# Patient Record
Sex: FEMALE | Race: WHITE | NOT HISPANIC OR LATINO | Employment: OTHER | URBAN - METROPOLITAN AREA
[De-identification: names, ages, dates, MRNs, and addresses within clinical notes are randomized per-mention and may not be internally consistent; named-entity substitution may affect disease eponyms.]

---

## 2024-09-16 ENCOUNTER — APPOINTMENT (OUTPATIENT)
Dept: PRIMARY CARE | Facility: CLINIC | Age: 64
End: 2024-09-16
Payer: COMMERCIAL

## 2024-09-16 VITALS
SYSTOLIC BLOOD PRESSURE: 122 MMHG | WEIGHT: 151 LBS | DIASTOLIC BLOOD PRESSURE: 68 MMHG | OXYGEN SATURATION: 98 % | BODY MASS INDEX: 26.75 KG/M2 | HEART RATE: 86 BPM | HEIGHT: 63 IN

## 2024-09-16 DIAGNOSIS — Z12.31 BREAST CANCER SCREENING BY MAMMOGRAM: ICD-10-CM

## 2024-09-16 DIAGNOSIS — Z13.220 LIPID SCREENING: ICD-10-CM

## 2024-09-16 DIAGNOSIS — Z12.11 ENCOUNTER FOR SCREENING FOR MALIGNANT NEOPLASM OF COLON: ICD-10-CM

## 2024-09-16 DIAGNOSIS — R53.83 OTHER FATIGUE: ICD-10-CM

## 2024-09-16 DIAGNOSIS — Z00.00 ANNUAL PHYSICAL EXAM: ICD-10-CM

## 2024-09-16 DIAGNOSIS — Z00.00 MEDICARE ANNUAL WELLNESS VISIT, INITIAL: Primary | ICD-10-CM

## 2024-09-16 DIAGNOSIS — Z13.29 THYROID DISORDER SCREENING: ICD-10-CM

## 2024-09-16 PROCEDURE — 3008F BODY MASS INDEX DOCD: CPT | Performed by: STUDENT IN AN ORGANIZED HEALTH CARE EDUCATION/TRAINING PROGRAM

## 2024-09-16 PROCEDURE — G0438 PPPS, INITIAL VISIT: HCPCS | Performed by: STUDENT IN AN ORGANIZED HEALTH CARE EDUCATION/TRAINING PROGRAM

## 2024-09-16 PROCEDURE — 1159F MED LIST DOCD IN RCRD: CPT | Performed by: STUDENT IN AN ORGANIZED HEALTH CARE EDUCATION/TRAINING PROGRAM

## 2024-09-16 PROCEDURE — 1036F TOBACCO NON-USER: CPT | Performed by: STUDENT IN AN ORGANIZED HEALTH CARE EDUCATION/TRAINING PROGRAM

## 2024-09-16 PROCEDURE — 1160F RVW MEDS BY RX/DR IN RCRD: CPT | Performed by: STUDENT IN AN ORGANIZED HEALTH CARE EDUCATION/TRAINING PROGRAM

## 2024-09-16 PROCEDURE — 1170F FXNL STATUS ASSESSED: CPT | Performed by: STUDENT IN AN ORGANIZED HEALTH CARE EDUCATION/TRAINING PROGRAM

## 2024-09-16 PROCEDURE — 99387 INIT PM E/M NEW PAT 65+ YRS: CPT | Performed by: STUDENT IN AN ORGANIZED HEALTH CARE EDUCATION/TRAINING PROGRAM

## 2024-09-16 PROCEDURE — 1124F ACP DISCUSS-NO DSCNMKR DOCD: CPT | Performed by: STUDENT IN AN ORGANIZED HEALTH CARE EDUCATION/TRAINING PROGRAM

## 2024-09-16 ASSESSMENT — ENCOUNTER SYMPTOMS
GASTROINTESTINAL NEGATIVE: 1
NERVOUS/ANXIOUS: 0
FATIGUE: 0
PALPITATIONS: 0
CHEST TIGHTNESS: 0
SHORTNESS OF BREATH: 0
LIGHT-HEADEDNESS: 0
DYSPHORIC MOOD: 0
HEADACHES: 0
MUSCULOSKELETAL NEGATIVE: 1
DIFFICULTY URINATING: 0
ABDOMINAL PAIN: 0
UNEXPECTED WEIGHT CHANGE: 0
DIARRHEA: 0
WHEEZING: 0
ACTIVITY CHANGE: 0
SINUS PRESSURE: 0
SLEEP DISTURBANCE: 0
CONSTIPATION: 0
DIZZINESS: 0

## 2024-09-16 ASSESSMENT — PATIENT HEALTH QUESTIONNAIRE - PHQ9
SUM OF ALL RESPONSES TO PHQ9 QUESTIONS 1 AND 2: 0
2. FEELING DOWN, DEPRESSED OR HOPELESS: NOT AT ALL
2. FEELING DOWN, DEPRESSED OR HOPELESS: NOT AT ALL
SUM OF ALL RESPONSES TO PHQ9 QUESTIONS 1 AND 2: 0
1. LITTLE INTEREST OR PLEASURE IN DOING THINGS: NOT AT ALL
1. LITTLE INTEREST OR PLEASURE IN DOING THINGS: NOT AT ALL

## 2024-09-16 ASSESSMENT — ACTIVITIES OF DAILY LIVING (ADL)
DRESSING: INDEPENDENT
DOING_HOUSEWORK: INDEPENDENT
GROCERY_SHOPPING: INDEPENDENT
BATHING: INDEPENDENT
TAKING_MEDICATION: INDEPENDENT
MANAGING_FINANCES: INDEPENDENT

## 2024-09-16 NOTE — PROGRESS NOTES
Subjective   Patient ID: Sonya Macario is a 64 y.o. female who presents for Medicare Annual Wellness Visit Initial (Medicare initial wellness ).  Reports doesn't always walk as fast as her family due to joint pains.     Previously was going to [facility]. Last labs were 2017.     Last mammogram was many years ago.     Reports prior sigmoidoscopy many years ago, states was not sedated. Denies any bowel changes.     No recent blood tests.     Reports no issues with sleep.     No other concerns today.         Review of Systems   Constitutional:  Negative for activity change, fatigue and unexpected weight change.   HENT: Negative.  Negative for congestion, ear pain and sinus pressure.    Eyes:  Negative for visual disturbance.   Respiratory:  Negative for chest tightness, shortness of breath and wheezing.    Cardiovascular:  Negative for chest pain, palpitations and leg swelling.   Gastrointestinal: Negative.  Negative for abdominal pain, constipation and diarrhea.   Genitourinary:  Negative for difficulty urinating.   Musculoskeletal: Negative.    Skin:  Negative for rash.   Neurological:  Negative for dizziness, light-headedness and headaches.   Psychiatric/Behavioral:  Negative for dysphoric mood and sleep disturbance. The patient is not nervous/anxious.    All other systems reviewed and are negative.      Objective   Physical Exam  Vitals reviewed.   Constitutional:       General: She is not in acute distress.  HENT:      Head: Normocephalic.      Right Ear: External ear normal.      Left Ear: External ear normal.      Nose: Nose normal.   Eyes:      Extraocular Movements: Extraocular movements intact.      Pupils: Pupils are equal, round, and reactive to light.   Cardiovascular:      Rate and Rhythm: Normal rate and regular rhythm.      Heart sounds: Normal heart sounds.   Pulmonary:      Effort: Pulmonary effort is normal.      Breath sounds: Normal breath sounds.   Abdominal:      Palpations: Abdomen is  soft.      Tenderness: There is no abdominal tenderness. There is no guarding.   Musculoskeletal:      Cervical back: Normal range of motion and neck supple.   Skin:     General: Skin is warm and dry.   Neurological:      Mental Status: She is alert. Mental status is at baseline.   Psychiatric:         Mood and Affect: Mood normal.         Behavior: Behavior normal.         Body mass index is 26.75 kg/m².    No current outpatient medications on file.\      Assessment/Plan   Diagnoses and all orders for this visit:  Medicare annual wellness visit, initial  Comments:  encouraged her to get updated mammogram and complete cologuard  orders placed  Encounter for screening for malignant neoplasm of colon  -     Cologuard® colon cancer screening; Future  Lipid screening  -     Lipid panel; Future  Thyroid disorder screening  -     Tsh With Reflex To Free T4 If Abnormal; Future  Breast cancer screening by mammogram  -     BI mammo bilateral screening tomosynthesis; Future  Other fatigue  -     Hemoglobin A1c; Future  -     Comprehensive metabolic panel; Future  -     CBC and Auto Differential; Future  Annual physical exam    Follow up annually       Kiah Yung DO 09/16/24 12:40 PM

## 2024-09-19 ENCOUNTER — LAB (OUTPATIENT)
Dept: LAB | Facility: LAB | Age: 64
End: 2024-09-19
Payer: COMMERCIAL

## 2024-09-19 DIAGNOSIS — R53.83 OTHER FATIGUE: ICD-10-CM

## 2024-09-19 DIAGNOSIS — Z13.29 THYROID DISORDER SCREENING: ICD-10-CM

## 2024-09-19 DIAGNOSIS — Z13.220 LIPID SCREENING: ICD-10-CM

## 2024-09-19 LAB
ALBUMIN SERPL BCP-MCNC: 4.4 G/DL (ref 3.4–5)
ALP SERPL-CCNC: 78 U/L (ref 33–136)
ALT SERPL W P-5'-P-CCNC: 34 U/L (ref 7–45)
ANION GAP SERPL CALC-SCNC: 10 MMOL/L (ref 10–20)
AST SERPL W P-5'-P-CCNC: 26 U/L (ref 9–39)
BASOPHILS # BLD AUTO: 0.07 X10*3/UL (ref 0–0.1)
BASOPHILS NFR BLD AUTO: 1.1 %
BILIRUB SERPL-MCNC: 0.9 MG/DL (ref 0–1.2)
BUN SERPL-MCNC: 16 MG/DL (ref 6–23)
CALCIUM SERPL-MCNC: 9.5 MG/DL (ref 8.6–10.3)
CHLORIDE SERPL-SCNC: 103 MMOL/L (ref 98–107)
CHOLEST SERPL-MCNC: 260 MG/DL (ref 0–199)
CHOLESTEROL/HDL RATIO: 4.3
CO2 SERPL-SCNC: 31 MMOL/L (ref 21–32)
CREAT SERPL-MCNC: 0.87 MG/DL (ref 0.5–1.05)
EGFRCR SERPLBLD CKD-EPI 2021: 74 ML/MIN/1.73M*2
EOSINOPHIL # BLD AUTO: 0.21 X10*3/UL (ref 0–0.7)
EOSINOPHIL NFR BLD AUTO: 3.3 %
ERYTHROCYTE [DISTWIDTH] IN BLOOD BY AUTOMATED COUNT: 12.8 % (ref 11.5–14.5)
GLUCOSE SERPL-MCNC: 98 MG/DL (ref 74–99)
HCT VFR BLD AUTO: 41.1 % (ref 36–46)
HDLC SERPL-MCNC: 60.8 MG/DL
HGB BLD-MCNC: 13.7 G/DL (ref 12–16)
IMM GRANULOCYTES # BLD AUTO: 0.02 X10*3/UL (ref 0–0.7)
IMM GRANULOCYTES NFR BLD AUTO: 0.3 % (ref 0–0.9)
LDLC SERPL CALC-MCNC: 174 MG/DL
LYMPHOCYTES # BLD AUTO: 2.73 X10*3/UL (ref 1.2–4.8)
LYMPHOCYTES NFR BLD AUTO: 43 %
MCH RBC QN AUTO: 30.3 PG (ref 26–34)
MCHC RBC AUTO-ENTMCNC: 33.3 G/DL (ref 32–36)
MCV RBC AUTO: 91 FL (ref 80–100)
MONOCYTES # BLD AUTO: 0.48 X10*3/UL (ref 0.1–1)
MONOCYTES NFR BLD AUTO: 7.6 %
NEUTROPHILS # BLD AUTO: 2.84 X10*3/UL (ref 1.2–7.7)
NEUTROPHILS NFR BLD AUTO: 44.7 %
NON HDL CHOLESTEROL: 199 MG/DL (ref 0–149)
NRBC BLD-RTO: 0 /100 WBCS (ref 0–0)
PLATELET # BLD AUTO: 219 X10*3/UL (ref 150–450)
POTASSIUM SERPL-SCNC: 3.9 MMOL/L (ref 3.5–5.3)
PROT SERPL-MCNC: 7.6 G/DL (ref 6.4–8.2)
RBC # BLD AUTO: 4.52 X10*6/UL (ref 4–5.2)
SODIUM SERPL-SCNC: 140 MMOL/L (ref 136–145)
TRIGL SERPL-MCNC: 125 MG/DL (ref 0–149)
TSH SERPL-ACNC: 1.23 MIU/L (ref 0.44–3.98)
VLDL: 25 MG/DL (ref 0–40)
WBC # BLD AUTO: 6.4 X10*3/UL (ref 4.4–11.3)

## 2024-09-19 PROCEDURE — 36415 COLL VENOUS BLD VENIPUNCTURE: CPT

## 2024-09-19 PROCEDURE — 80053 COMPREHEN METABOLIC PANEL: CPT

## 2024-09-19 PROCEDURE — 83036 HEMOGLOBIN GLYCOSYLATED A1C: CPT

## 2024-09-19 PROCEDURE — 85025 COMPLETE CBC W/AUTO DIFF WBC: CPT

## 2024-09-19 PROCEDURE — 80061 LIPID PANEL: CPT

## 2024-09-19 PROCEDURE — 84443 ASSAY THYROID STIM HORMONE: CPT

## 2024-09-20 LAB
EST. AVERAGE GLUCOSE BLD GHB EST-MCNC: 137 MG/DL
HBA1C MFR BLD: 6.4 %

## 2024-10-04 ENCOUNTER — APPOINTMENT (OUTPATIENT)
Dept: RADIOLOGY | Facility: CLINIC | Age: 64
End: 2024-10-04
Payer: COMMERCIAL

## 2024-10-10 ENCOUNTER — HOSPITAL ENCOUNTER (OUTPATIENT)
Dept: RADIOLOGY | Facility: CLINIC | Age: 64
Discharge: HOME | End: 2024-10-10
Payer: MEDICARE

## 2024-10-10 DIAGNOSIS — R92.8 ABNORMAL MAMMOGRAM: Primary | ICD-10-CM

## 2024-10-10 DIAGNOSIS — Z12.31 BREAST CANCER SCREENING BY MAMMOGRAM: ICD-10-CM

## 2024-10-10 PROCEDURE — 77063 BREAST TOMOSYNTHESIS BI: CPT | Performed by: RADIOLOGY

## 2024-10-10 PROCEDURE — 77067 SCR MAMMO BI INCL CAD: CPT | Performed by: RADIOLOGY

## 2024-10-10 PROCEDURE — 77067 SCR MAMMO BI INCL CAD: CPT

## 2024-10-21 ENCOUNTER — HOSPITAL ENCOUNTER (OUTPATIENT)
Dept: RADIOLOGY | Facility: CLINIC | Age: 64
Discharge: HOME | End: 2024-10-21
Payer: MEDICARE

## 2024-10-21 DIAGNOSIS — R92.8 ABNORMAL MAMMOGRAM: ICD-10-CM

## 2024-10-21 PROCEDURE — G0279 TOMOSYNTHESIS, MAMMO: HCPCS | Performed by: STUDENT IN AN ORGANIZED HEALTH CARE EDUCATION/TRAINING PROGRAM

## 2024-10-21 PROCEDURE — 77066 DX MAMMO INCL CAD BI: CPT | Performed by: STUDENT IN AN ORGANIZED HEALTH CARE EDUCATION/TRAINING PROGRAM

## 2024-10-21 PROCEDURE — 77062 BREAST TOMOSYNTHESIS BI: CPT

## 2024-12-08 LAB — NONINV COLON CA DNA+OCC BLD SCRN STL QL: NEGATIVE

## 2025-03-28 ENCOUNTER — APPOINTMENT (OUTPATIENT)
Dept: PRIMARY CARE | Facility: CLINIC | Age: 65
End: 2025-03-28
Payer: COMMERCIAL

## 2025-03-28 VITALS
OXYGEN SATURATION: 96 % | SYSTOLIC BLOOD PRESSURE: 112 MMHG | DIASTOLIC BLOOD PRESSURE: 60 MMHG | WEIGHT: 146 LBS | HEART RATE: 73 BPM | BODY MASS INDEX: 25.87 KG/M2 | HEIGHT: 63 IN

## 2025-03-28 DIAGNOSIS — R53.83 OTHER FATIGUE: ICD-10-CM

## 2025-03-28 DIAGNOSIS — G89.29 CHRONIC PAIN OF BOTH WRISTS: ICD-10-CM

## 2025-03-28 DIAGNOSIS — M25.531 CHRONIC PAIN OF BOTH WRISTS: ICD-10-CM

## 2025-03-28 DIAGNOSIS — E13.9 OTHER SPECIFIED DIABETES MELLITUS WITHOUT COMPLICATION, WITHOUT LONG-TERM CURRENT USE OF INSULIN: ICD-10-CM

## 2025-03-28 DIAGNOSIS — Z13.220 LIPID SCREENING: Primary | ICD-10-CM

## 2025-03-28 DIAGNOSIS — M25.532 CHRONIC PAIN OF BOTH WRISTS: ICD-10-CM

## 2025-03-28 DIAGNOSIS — R73.03 PREDIABETES: ICD-10-CM

## 2025-03-28 LAB — HBA1C MFR BLD: 6.1 % (ref 4.2–6.5)

## 2025-03-28 PROCEDURE — 3008F BODY MASS INDEX DOCD: CPT | Performed by: STUDENT IN AN ORGANIZED HEALTH CARE EDUCATION/TRAINING PROGRAM

## 2025-03-28 PROCEDURE — 1159F MED LIST DOCD IN RCRD: CPT | Performed by: STUDENT IN AN ORGANIZED HEALTH CARE EDUCATION/TRAINING PROGRAM

## 2025-03-28 PROCEDURE — 99214 OFFICE O/P EST MOD 30 MIN: CPT | Performed by: STUDENT IN AN ORGANIZED HEALTH CARE EDUCATION/TRAINING PROGRAM

## 2025-03-28 PROCEDURE — 83036 HEMOGLOBIN GLYCOSYLATED A1C: CPT | Mod: CLIA WAIVED TEST | Performed by: STUDENT IN AN ORGANIZED HEALTH CARE EDUCATION/TRAINING PROGRAM

## 2025-03-28 PROCEDURE — 1036F TOBACCO NON-USER: CPT | Performed by: STUDENT IN AN ORGANIZED HEALTH CARE EDUCATION/TRAINING PROGRAM

## 2025-03-28 PROCEDURE — 1160F RVW MEDS BY RX/DR IN RCRD: CPT | Performed by: STUDENT IN AN ORGANIZED HEALTH CARE EDUCATION/TRAINING PROGRAM

## 2025-03-28 ASSESSMENT — PATIENT HEALTH QUESTIONNAIRE - PHQ9
1. LITTLE INTEREST OR PLEASURE IN DOING THINGS: NOT AT ALL
SUM OF ALL RESPONSES TO PHQ9 QUESTIONS 1 AND 2: 0
2. FEELING DOWN, DEPRESSED OR HOPELESS: NOT AT ALL

## 2025-03-28 NOTE — PROGRESS NOTES
Subjective   Patient ID: Sonya Macario is a 65 y.o. female who presents for Follow-up (6 month follow up /A1C check /Bilateral wrist pain and joint pain. Broke both wrists as a child so she said they will hurt from time to time ).  History of Present Illness  Sonya Macario is a 65 year old female with diabetes who presents for follow-up on her blood sugar control and cholesterol levels.    She has been actively modifying her diet and exercise regimen to improve her blood sugar levels. She has reduced sugar intake, limiting herself to one can of pop per week and opting for unsweetened iced tea with a splash of lemonade. Candy consumption has decreased significantly, and she now only has donuts once a month. She has lost five pounds, although her goal was twenty. Her A1c has improved from 6.4 to 6.1. She has increased her physical activity, attending the rec center five days a week, which includes a half-hour on the bike daily and a yoga balance class once a week. She started using the rec center more frequently since October due to the weather.    She is concerned about her cholesterol levels, particularly due to her 's family history of high cholesterol, although she is unaware of any such history in her own family. She is mindful of her diet, choosing multi-grain bread and lower sodium turkey breast, and has been watching her sodium intake since her twenties due to swelling when flying.    She experienced wrist pain, which she attributes to old injuries from childhood. The pain resolved with pressure and Tylenol. Her wrist sometimes clicks, and she has had carpal tunnel symptoms in the past, which improved with rest.    She has noticed improvements in her digestion since modifying her diet, particularly reducing chili intake, which previously caused heartburn. She drinks pop infrequently and occasionally has a glass of wine with Sprite.     She experiences occasional sharp back pain, which resolves quickly  "and attributes these transient pains to aging.    She had a mammogram that showed microcalcifications, which she wonders might be related to her puppy jumping on her chest.     Review of Systems  ROS otherwise negative aside from what was mentioned above in HPI.    Objective     /60 (BP Location: Left arm, Patient Position: Sitting, BP Cuff Size: Adult)   Pulse 73   Ht 1.6 m (5' 3\")   Wt 66.2 kg (146 lb)   SpO2 96%   BMI 25.86 kg/m²      No current outpatient medications on file.    Physical Exam  GENERAL: Alert, cooperative, well developed, no acute distress.  HEENT: Normocephalic, normal oropharynx, moist mucous membranes.  CHEST: Clear to auscultation bilaterally, no wheezes, rhonchi, or crackles.  CARDIOVASCULAR: Normal heart rate and rhythm, S1 and S2 normal without murmurs.  ABDOMEN: Soft, non-tender, non-distended, without organomegaly, normal bowel sounds.  EXTREMITIES: No cyanosis or edema.  NEUROLOGICAL: Cranial nerves grossly intact, moves all extremities without gross motor or sensory deficit.    Results  LABS  A1c: 6.1 (03/28/2025)    RADIOLOGY  Mammogram: Microcalcifications in the breast, not concerning    Assessment & Plan  Pre-diabetes  A1c improved from 6.4% to 6.1%, indicating better glycemic control. She has been reducing sugar intake and increasing physical activity, contributing to this improvement. Concerned about family history of diabetes, but current management is effective. Consistency in lifestyle modifications is key.  - Continue current lifestyle modifications, including regular exercise and reduced sugar intake.  - Monitor A1c levels regularly to track progress.    Hyperlipidemia  Elevated cholesterol levels. No family history of hyperlipidemia, but 's family has high cholesterol. She is mindful of dietary choices, opting for lower sodium and healthier options. Cholesterol levels will be checked today to assess current status.  - Check cholesterol levels today to " assess current status.  - Continue managing diet to control cholesterol levels.    Joint Pain  Experienced wrist pain, resolved with pressure and acetaminophen. Pain possibly related to weather changes and old injuries from childhood. History of carpal tunnel syndrome but no recent issues. Pain may be influenced by barometric pressure changes.  - Consider further evaluation if pain persists or worsens.    General Health Maintenance  Engaged in regular exercise and dietary modifications. Mindful of sodium intake due to past experiences with swelling during flights. Plans to use compression socks during upcoming flight to prevent swelling.  - Encourage continued regular exercise and healthy eating habits.  - Use compression socks during flights to prevent swelling.  - Schedule annual wellness visit in the fall for routine check-up and repeat labs.        Kiah Yung, DO     This medical note was created with the assistance of artificial intelligence (AI) for documentation purposes. The content has been reviewed and confirmed by the healthcare provider for accuracy and completeness. Patient consented to the use of audio recording and use of AI during their visit.

## 2025-03-29 LAB
CHOLEST SERPL-MCNC: 272 MG/DL
CHOLEST/HDLC SERPL: 4.3 (CALC)
HDLC SERPL-MCNC: 63 MG/DL
LDLC SERPL CALC-MCNC: 183 MG/DL (CALC)
NONHDLC SERPL-MCNC: 209 MG/DL (CALC)
TRIGL SERPL-MCNC: 125 MG/DL

## 2025-04-24 ENCOUNTER — TELEPHONE (OUTPATIENT)
Dept: PRIMARY CARE | Facility: CLINIC | Age: 65
End: 2025-04-24
Payer: MEDICARE

## 2025-04-24 DIAGNOSIS — R92.8 ABNORMAL MAMMOGRAM: Primary | ICD-10-CM

## 2025-04-24 NOTE — TELEPHONE ENCOUNTER
Sonya Renaldo called wanting to know if she could get another referral for a diagnostic mammogram. She said that something showed up on her mammogram and they told her she needed another one but insurance was giving her a hard time.  She last seen Dr Yung on 3/8/25 and mammogram was done 10/21/24. Please advise and thank you.

## 2025-05-09 ENCOUNTER — HOSPITAL ENCOUNTER (OUTPATIENT)
Dept: RADIOLOGY | Facility: CLINIC | Age: 65
Discharge: HOME | End: 2025-05-09
Payer: MEDICARE

## 2025-05-09 ENCOUNTER — DOCUMENTATION (OUTPATIENT)
Dept: SURGERY | Facility: CLINIC | Age: 65
End: 2025-05-09
Payer: MEDICARE

## 2025-05-09 DIAGNOSIS — R92.8 ABNORMAL MAMMOGRAM: ICD-10-CM

## 2025-05-09 PROCEDURE — 77065 DX MAMMO INCL CAD UNI: CPT | Mod: LT

## 2025-05-09 PROCEDURE — 77065 DX MAMMO INCL CAD UNI: CPT | Mod: LEFT SIDE | Performed by: RADIOLOGY

## 2025-05-09 PROCEDURE — 77061 BREAST TOMOSYNTHESIS UNI: CPT | Mod: LEFT SIDE | Performed by: RADIOLOGY

## 2025-05-09 NOTE — PROGRESS NOTES
Patient here for breast imaging. She has been recommended a left breast stereotactic biopsy. I met with the patient and explained the procedure to her. She would like to think about if she would like to have the biopsy or not. I explained that its important for her to let us know whether or not she will have it done so we can make sure she is scheduled for follow up as needed. I will send a message to Dr. Yung with an update as well.

## 2025-05-13 ENCOUNTER — TELEPHONE (OUTPATIENT)
Dept: SURGERY | Facility: CLINIC | Age: 65
End: 2025-05-13
Payer: MEDICARE

## 2025-05-13 NOTE — TELEPHONE ENCOUNTER
Left voicemail to check in with patient and see if she had any questions and have made any decisions about her need for a breast biopsy.

## 2025-05-22 NOTE — PROGRESS NOTES
"History Of Present Illness  HPI   The patient is a 65-year-old female who had a recent abnormal left breast mammogram.  She has no palpable breast lumps and no breast pain.  No nipple discharge or retraction.  No prior breast biopsy.  Age of menarche was 10.  G2, P2.  Age at first childbirth was 22.  Menopause in 2007.  Family history: No breast cancer.  Maternal aunt may have had metastatic uterine cancer.    Past medical history:  Prediabetes  Broken nose at age 15    Past Medical History  She has no past medical history on file.    Surgical History  She has no past surgical history on file.     Allergies  Sulfamethoxazole-trimethoprim    Social History  She reports that she has never smoked. She has never used smokeless tobacco. She reports current alcohol use. She reports that she does not use drugs.    Family History  Family History[1]    Review of Systems  Review of Systems:  Constitutional:  no fever, no chills, no significant weight change  Neurological: No history of CVA or seizure disorder  Eyes: No pain, no recent visual change  ENT:  No recent hearing loss  Neck: No pain  Cardiovascular: No chest pain, no history of cardiac disease such as myocardial infarction or arrhythmia or congestive heart failure  Pulmonary: No shortness of breath, no history of pulmonary disease such as pneumonia or COPD  Breast: No history of breast disease or breast mass  Gastrointestinal:   no abdominal pain, no nausea or vomiting, no constipation or diarrhea or blood in the stool.  No history of ulcers.  No liver, gallbladder or pancreas disease.  No intestinal disorder.  Genitourinary: No hematuria or dysuria, no kidney disease  Musculoskeletal: Occasional left knee pain  Integumentary:  no rash  Psychiatric:  No anxiety or depression  Endocrine: As above  Hematologic/Lymphatic: Bruises easily    Last Recorded Vitals  Blood pressure 132/78, pulse 75, temperature 35.7 °C (96.3 °F), resp. rate 18, height 1.6 m (5' 3\"), weight " 64.4 kg (142 lb), SpO2 99%.    Physical Exam  Constitutional: Well-developed, well-nourished, alert and oriented, no acute distress  Skin: Warm and dry, no lesions, no rashes, no jaundice  HEENT: Normocephalic, atraumatic, EOMI, no scleral icterus, mucous membranes moist, no lesions seen  Neck: Soft, nontender, no mass or adenopathy  Cardiac: Regular rate and rhythm, no murmur  Chest: Patent airway, clear to auscultation, normal breath sounds with good chest expansion, no wheezes or rales or rhonchi noted, thorax symmetric  Breast:     right breast: No skin changes, nontender, no mass, mild fibrocystic change    left breast: No skin changes, nontender, no mass, mild fibrocystic change  Abdomen: Nondistended, soft, nontender, no mass  Rectal: Not performed  Extremities: No injury, no lower extremity edema or calf tenderness  Lymphatic: No cervical or axillary adenopathy  Musculoskeletal: Range of motion intact, no joint swelling, normal strength  Neurological: Alert and oriented x3, intact sensory and motor function, no obvious focal neurologic abnormalities  Psychological: Appropriate mood and behavior  Examination chaperoned by Gaviota    Relevant Results  I reviewed the left breast diagnostic mammogram from May 9, 2025:  IMPRESSION:  Grouping of left breast microcalcifications as described for which  stereotactic biopsy recommended.  BI-RADS CATEGORY:  BI-RADS Category:  4 Suspicious.  Recommendation:  Surgical Consultation and Biopsy.  Recommended Date:  Immediate.  Laterality:  Left.    I reviewed the bilateral diagnostic mammogram report and images from October 21, 2024:  IMPRESSION:  1. Probably benign left breast calcifications. A six-month  mammographic follow-up is recommended to ensure stability.  2. Resolution of the right breast asymmetry.  BI-RADS Category:  3 Probably Benign.  Recommendation:  Short-term Interval Follow-up Imaging.  Recommended Date:  6 Months.  Laterality:  Left.      Assessment/Plan    Diagnoses and all orders for this visit:  Abnormal mammogram of left breast  No breast mass on examination.  Left breast subareolar calcifications which the radiologist feels is slightly more pronounced than previous mammogram and recommends stereotactic biopsy.    Follow-up after biopsy completed.  I answered all of the patient's questions.    Shiraz Espinosa MD         [1] No family history on file.

## 2025-05-23 ENCOUNTER — OFFICE VISIT (OUTPATIENT)
Dept: SURGERY | Facility: CLINIC | Age: 65
End: 2025-05-23
Payer: MEDICARE

## 2025-05-23 VITALS
RESPIRATION RATE: 18 BRPM | HEART RATE: 75 BPM | SYSTOLIC BLOOD PRESSURE: 132 MMHG | WEIGHT: 142 LBS | DIASTOLIC BLOOD PRESSURE: 78 MMHG | BODY MASS INDEX: 25.16 KG/M2 | HEIGHT: 63 IN | TEMPERATURE: 96.3 F | OXYGEN SATURATION: 99 %

## 2025-05-23 DIAGNOSIS — R92.8 ABNORMAL MAMMOGRAM OF LEFT BREAST: Primary | ICD-10-CM

## 2025-05-23 PROCEDURE — 99204 OFFICE O/P NEW MOD 45 MIN: CPT | Performed by: SURGERY

## 2025-05-23 PROCEDURE — 1159F MED LIST DOCD IN RCRD: CPT | Performed by: SURGERY

## 2025-05-23 PROCEDURE — 1126F AMNT PAIN NOTED NONE PRSNT: CPT | Performed by: SURGERY

## 2025-05-23 PROCEDURE — 3008F BODY MASS INDEX DOCD: CPT | Performed by: SURGERY

## 2025-05-23 PROCEDURE — 99214 OFFICE O/P EST MOD 30 MIN: CPT | Performed by: SURGERY

## 2025-05-23 PROCEDURE — 1036F TOBACCO NON-USER: CPT | Performed by: SURGERY

## 2025-05-23 SDOH — ECONOMIC STABILITY: FOOD INSECURITY: WITHIN THE PAST 12 MONTHS, YOU WORRIED THAT YOUR FOOD WOULD RUN OUT BEFORE YOU GOT MONEY TO BUY MORE.: NEVER TRUE

## 2025-05-23 SDOH — ECONOMIC STABILITY: FOOD INSECURITY: WITHIN THE PAST 12 MONTHS, THE FOOD YOU BOUGHT JUST DIDN'T LAST AND YOU DIDN'T HAVE MONEY TO GET MORE.: NEVER TRUE

## 2025-05-23 ASSESSMENT — PATIENT HEALTH QUESTIONNAIRE - PHQ9
SUM OF ALL RESPONSES TO PHQ9 QUESTIONS 1 & 2: 0
1. LITTLE INTEREST OR PLEASURE IN DOING THINGS: NOT AT ALL
2. FEELING DOWN, DEPRESSED OR HOPELESS: NOT AT ALL

## 2025-05-23 ASSESSMENT — COLUMBIA-SUICIDE SEVERITY RATING SCALE - C-SSRS
6. HAVE YOU EVER DONE ANYTHING, STARTED TO DO ANYTHING, OR PREPARED TO DO ANYTHING TO END YOUR LIFE?: NO
2. HAVE YOU ACTUALLY HAD ANY THOUGHTS OF KILLING YOURSELF?: NO
1. IN THE PAST MONTH, HAVE YOU WISHED YOU WERE DEAD OR WISHED YOU COULD GO TO SLEEP AND NOT WAKE UP?: NO

## 2025-05-23 ASSESSMENT — ENCOUNTER SYMPTOMS
LOSS OF SENSATION IN FEET: 1
DEPRESSION: 0
OCCASIONAL FEELINGS OF UNSTEADINESS: 0

## 2025-05-23 ASSESSMENT — LIFESTYLE VARIABLES
SKIP TO QUESTIONS 9-10: 1
HOW MANY STANDARD DRINKS CONTAINING ALCOHOL DO YOU HAVE ON A TYPICAL DAY: 1 OR 2
HOW OFTEN DO YOU HAVE SIX OR MORE DRINKS ON ONE OCCASION: NEVER
AUDIT-C TOTAL SCORE: 1
HOW OFTEN DO YOU HAVE A DRINK CONTAINING ALCOHOL: MONTHLY OR LESS

## 2025-05-23 ASSESSMENT — PAIN SCALES - GENERAL: PAINLEVEL_OUTOF10: 0-NO PAIN

## 2025-05-23 NOTE — LETTER
May 23, 2025     Kiah Yung DO  [address] [facility] [address]    Patient: Sonya Macario   YOB: 1960   Date of Visit: 5/23/2025       Dear Dr. Kiah Yung DO:    Thank you for referring Sonya Macario to me for evaluation. Below are my notes for this consultation.  If you have questions, please do not hesitate to call me. I look forward to following your patient along with you.       Sincerely,     Shiraz Espinosa MD      CC: No Recipients  ______________________________________________________________________________________    History Of Present Illness  HPI   The patient is a 65-year-old female who had a recent abnormal left breast mammogram.  She has no palpable breast lumps and no breast pain.  No nipple discharge or retraction.  No prior breast biopsy.  Age of menarche was 10.  G2, P2.  Age at first childbirth was 22.  Menopause in 2007.  Family history: No breast cancer.  Maternal aunt may have had metastatic uterine cancer.    Past medical history:  Prediabetes  Broken nose at age 15    Past Medical History  She has no past medical history on file.    Surgical History  She has no past surgical history on file.     Allergies  Sulfamethoxazole-trimethoprim    Social History  She reports that she has never smoked. She has never used smokeless tobacco. She reports current alcohol use. She reports that she does not use drugs.    Family History  Family History[1]    Review of Systems  Review of Systems:  Constitutional:  no fever, no chills, no significant weight change  Neurological: No history of CVA or seizure disorder  Eyes: No pain, no recent visual change  ENT:  No recent hearing loss  Neck: No pain  Cardiovascular: No chest pain, no history of cardiac disease such as myocardial infarction or arrhythmia or congestive heart failure  Pulmonary: No shortness of breath, no history of pulmonary disease such as pneumonia or COPD  Breast: No history of breast disease or breast  "mass  Gastrointestinal:   no abdominal pain, no nausea or vomiting, no constipation or diarrhea or blood in the stool.  No history of ulcers.  No liver, gallbladder or pancreas disease.  No intestinal disorder.  Genitourinary: No hematuria or dysuria, no kidney disease  Musculoskeletal: Occasional left knee pain  Integumentary:  no rash  Psychiatric:  No anxiety or depression  Endocrine: As above  Hematologic/Lymphatic: Bruises easily    Last Recorded Vitals  Blood pressure 132/78, pulse 75, temperature 35.7 °C (96.3 °F), resp. rate 18, height 1.6 m (5' 3\"), weight 64.4 kg (142 lb), SpO2 99%.    Physical Exam  Constitutional: Well-developed, well-nourished, alert and oriented, no acute distress  Skin: Warm and dry, no lesions, no rashes, no jaundice  HEENT: Normocephalic, atraumatic, EOMI, no scleral icterus, mucous membranes moist, no lesions seen  Neck: Soft, nontender, no mass or adenopathy  Cardiac: Regular rate and rhythm, no murmur  Chest: Patent airway, clear to auscultation, normal breath sounds with good chest expansion, no wheezes or rales or rhonchi noted, thorax symmetric  Breast:     right breast: No skin changes, nontender, no mass, mild fibrocystic change    left breast: No skin changes, nontender, no mass, mild fibrocystic change  Abdomen: Nondistended, soft, nontender, no mass  Rectal: Not performed  Extremities: No injury, no lower extremity edema or calf tenderness  Lymphatic: No cervical or axillary adenopathy  Musculoskeletal: Range of motion intact, no joint swelling, normal strength  Neurological: Alert and oriented x3, intact sensory and motor function, no obvious focal neurologic abnormalities  Psychological: Appropriate mood and behavior  Examination chaperoned by Gaviota    Relevant Results  I reviewed the left breast diagnostic mammogram from May 9, 2025:  IMPRESSION:  Grouping of left breast microcalcifications as described for which  stereotactic biopsy recommended.  BI-RADS " CATEGORY:  BI-RADS Category:  4 Suspicious.  Recommendation:  Surgical Consultation and Biopsy.  Recommended Date:  Immediate.  Laterality:  Left.    I reviewed the bilateral diagnostic mammogram report and images from October 21, 2024:  IMPRESSION:  1. Probably benign left breast calcifications. A six-month  mammographic follow-up is recommended to ensure stability.  2. Resolution of the right breast asymmetry.  BI-RADS Category:  3 Probably Benign.  Recommendation:  Short-term Interval Follow-up Imaging.  Recommended Date:  6 Months.  Laterality:  Left.      Assessment/Plan   Diagnoses and all orders for this visit:  Abnormal mammogram of left breast  No breast mass on examination.  Left breast subareolar calcifications which the radiologist feels is slightly more pronounced than previous mammogram and recommends stereotactic biopsy.    Follow-up after biopsy completed.  I answered all of the patient's questions.    Shiraz Espinosa MD         [1]  No family history on file.       [1]  No family history on file.

## 2025-05-27 ENCOUNTER — TELEPHONE (OUTPATIENT)
Dept: SURGERY | Facility: CLINIC | Age: 65
End: 2025-05-27
Payer: MEDICARE

## 2025-05-28 ENCOUNTER — TELEPHONE (OUTPATIENT)
Dept: SURGERY | Facility: CLINIC | Age: 65
End: 2025-05-28
Payer: MEDICARE

## 2025-05-28 NOTE — TELEPHONE ENCOUNTER
Spoke with patient, she states that she doesn't want to have the clip placed. I explained that we need to have the clip inserted in order to do the biopsy. She is worried about migration, numbness post op, and any other complications. Once I told her the clip needed to be placed she stated she would like to think about it again and call me tomorrow. I will continue to follow up with her.

## 2025-05-29 DIAGNOSIS — R92.8 ABNORMAL FINDING ON BREAST IMAGING: ICD-10-CM

## 2025-06-05 ENCOUNTER — HOSPITAL ENCOUNTER (OUTPATIENT)
Dept: RADIOLOGY | Facility: CLINIC | Age: 65
Discharge: HOME | End: 2025-06-05
Payer: MEDICARE

## 2025-06-05 VITALS
DIASTOLIC BLOOD PRESSURE: 70 MMHG | HEART RATE: 79 BPM | RESPIRATION RATE: 18 BRPM | OXYGEN SATURATION: 97 % | SYSTOLIC BLOOD PRESSURE: 113 MMHG

## 2025-06-05 DIAGNOSIS — R92.8 ABNORMAL FINDING ON BREAST IMAGING: ICD-10-CM

## 2025-06-05 DIAGNOSIS — R92.8 ABNORMAL MAMMOGRAM OF LEFT BREAST: Primary | ICD-10-CM

## 2025-06-05 PROCEDURE — C1739 HC OR 278 NO HCPCS: HCPCS

## 2025-06-05 PROCEDURE — 2720000007 HC OR 272 NO HCPCS

## 2025-06-05 PROCEDURE — 2780000003 HC OR 278 NO HCPCS

## 2025-06-05 PROCEDURE — 19081 BX BREAST 1ST LESION STRTCTC: CPT | Mod: LT

## 2025-06-05 RX ORDER — OXYCODONE AND ACETAMINOPHEN 5; 325 MG/1; MG/1
1 TABLET ORAL EVERY 6 HOURS PRN
Qty: 12 TABLET | Refills: 0 | Status: SHIPPED | OUTPATIENT
Start: 2025-06-05 | End: 2025-06-08

## 2025-06-05 ASSESSMENT — PAIN SCALES - GENERAL: PAINLEVEL_OUTOF10: 3

## 2025-06-05 ASSESSMENT — PAIN - FUNCTIONAL ASSESSMENT: PAIN_FUNCTIONAL_ASSESSMENT: 0-10

## 2025-06-05 NOTE — POST-PROCEDURE NOTE
Interventional Radiology Brief Postprocedure Note    Attending: Daisy Paul MD      Assistant:     Diagnosis: calcifications left breast    Description of procedure: stereotactic biopsy calcifications left breast     Anesthesia:  Local    Complications: part of the skin was biopsied    Estimated Blood Loss: none    Medications (Filter: Administrations occurring from 1407 to 1407 on 06/05/25)      None          No specimens collected      See detailed result report with images in PACS.    The patient tolerated the procedure well without incident or complication and is in stable condition.

## 2025-06-11 LAB
LABORATORY COMMENT REPORT: NORMAL
PATH REPORT.COMMENTS IMP SPEC: NORMAL
PATH REPORT.FINAL DX SPEC: NORMAL
PATH REPORT.GROSS SPEC: NORMAL
PATH REPORT.RELEVANT HX SPEC: NORMAL
PATH REPORT.TOTAL CANCER: NORMAL

## 2025-06-26 ENCOUNTER — TELEPHONE (OUTPATIENT)
Dept: SURGERY | Facility: CLINIC | Age: 65
End: 2025-06-26
Payer: MEDICARE

## 2025-06-26 NOTE — TELEPHONE ENCOUNTER
Pt placed a request via [employer] to cancel her appointment with Dr Espinosa 6/27/25. I called pt and left a voicemail to call back and reschedule.

## 2025-06-27 ENCOUNTER — OFFICE VISIT (OUTPATIENT)
Dept: SURGERY | Facility: CLINIC | Age: 65
End: 2025-06-27
Payer: MEDICARE

## 2025-06-27 ENCOUNTER — APPOINTMENT (OUTPATIENT)
Dept: SURGERY | Facility: CLINIC | Age: 65
End: 2025-06-27
Payer: MEDICARE

## 2025-06-27 VITALS
WEIGHT: 142 LBS | RESPIRATION RATE: 18 BRPM | HEIGHT: 63 IN | TEMPERATURE: 97.6 F | BODY MASS INDEX: 25.16 KG/M2 | DIASTOLIC BLOOD PRESSURE: 81 MMHG | OXYGEN SATURATION: 97 % | HEART RATE: 81 BPM | SYSTOLIC BLOOD PRESSURE: 141 MMHG

## 2025-06-27 DIAGNOSIS — R92.8 ABNORMAL MAMMOGRAM OF LEFT BREAST: Primary | ICD-10-CM

## 2025-06-27 PROCEDURE — 1126F AMNT PAIN NOTED NONE PRSNT: CPT | Performed by: SURGERY

## 2025-06-27 PROCEDURE — 99212 OFFICE O/P EST SF 10 MIN: CPT | Performed by: SURGERY

## 2025-06-27 PROCEDURE — 1159F MED LIST DOCD IN RCRD: CPT | Performed by: SURGERY

## 2025-06-27 PROCEDURE — 1036F TOBACCO NON-USER: CPT | Performed by: SURGERY

## 2025-06-27 PROCEDURE — 3008F BODY MASS INDEX DOCD: CPT | Performed by: SURGERY

## 2025-06-27 ASSESSMENT — ENCOUNTER SYMPTOMS
LOSS OF SENSATION IN FEET: 0
OCCASIONAL FEELINGS OF UNSTEADINESS: 0
DEPRESSION: 0

## 2025-06-27 ASSESSMENT — PAIN SCALES - GENERAL: PAINLEVEL_OUTOF10: 0-NO PAIN

## 2025-06-27 NOTE — LETTER
"June 27, 2025     Kiah Yung DO  [address] [facility] [address]    Patient: Sonya Macario   YOB: 1960   Date of Visit: 6/27/2025       Dear Dr. Kiah Yung DO:    Thank you for referring Sonya Macario to me for evaluation. Below are my notes for this consultation.  If you have questions, please do not hesitate to call me. I look forward to following your patient along with you.       Sincerely,     Shiraz Espinosa MD      CC: No Recipients  ______________________________________________________________________________________    History Of Present Illness  HPI   May 23, 2025  The patient is a 65-year-old female who had a recent abnormal left breast mammogram.  She has no palpable breast lumps and no breast pain.  No nipple discharge or retraction.  No prior breast biopsy.  Age of menarche was 10.  G2, P2.  Age at first childbirth was 22.  Menopause in 2007.  Family history: No breast cancer.  Maternal aunt may have had metastatic uterine cancer.     June 27, 2025  The patient follows up after having left breast stereotactic biopsy.  The patient had bleeding after the biopsy which stopped.  She has a hematoma and bruising.  No other complaints.    Past medical history:  Prediabetes  Broken nose at age 15    Past Medical History  She has no past medical history on file.    Surgical History  She has a past surgical history that includes Breast biopsy (Left, 6/5/2025).     Allergies  Sulfamethoxazole-trimethoprim    Social History  She reports that she has never smoked. She has never used smokeless tobacco. She reports current alcohol use of about 1.0 standard drink of alcohol per week. She reports that she does not use drugs.    Family History  Family History[1]    Last Recorded Vitals  Blood pressure 141/81, pulse 81, temperature 36.4 °C (97.6 °F), resp. rate 18, height 1.6 m (5' 3\"), weight 64.4 kg (142 lb), SpO2 97%.    Physical Exam  General: Well-developed, well-nourished, no acute " distress, alert and oriented  Left breast wound 0.4 mm diameter eschar with 2 cm diameter hematoma.  Resolving ecchymosis of breast.  No erythema or signs of infection.  Examination chaperoned by Hyacinth    Relevant Results  Surgical Pathology Exam: [id number]  Order: [id number]   Collected 6/5/2025 14:07       Status: Final result       Dx: Abnormal finding on breast imaging    Test Result Released: Yes (seen)    0 Result Notes       Component  Resulting Agency   FINAL DIAGNOSIS   A. LEFT BREAST CALCIFICATIONS, STEREOTACTIC-GUIDED CORE BIOPSY:     -- Apocrine metaplasia and benign lobules with associated microcalcifications.  -- Focal fat necrosis.  -- Benign skin.           Assessment/Plan  Diagnoses and all orders for this visit:  Abnormal mammogram of left breast    Recovering well from left breast core biopsy.  Radiologist felt that the pathology is benign-concordant recommends routine mammographic screening.  The patient will be due for repeat bilateral mammogram in October.  I offered to order the mammogram and examine her after the mammogram is completed.  She prefers to have future mammograms and exams with her family doctor.  Breast self-examination monthly.  Follow-up as needed.    Shiraz Espinosa MD         [1]  Family History  Problem Relation Name Age of Onset   • Alcohol abuse Father Alejandro    • Diabetes Maternal Grandmother Cira    • Alcohol abuse Father Alejandro    • Diabetes Maternal Grandmother Cira         [1]  Family History  Problem Relation Name Age of Onset   • Alcohol abuse Father Alejandro    • Diabetes Maternal Grandmother Cira    • Alcohol abuse Father Alejandro    • Diabetes Maternal Grandmother Cira

## 2025-06-27 NOTE — PROGRESS NOTES
"History Of Present Illness  HPI   May 23, 2025  The patient is a 65-year-old female who had a recent abnormal left breast mammogram.  She has no palpable breast lumps and no breast pain.  No nipple discharge or retraction.  No prior breast biopsy.  Age of menarche was 10.  G2, P2.  Age at first childbirth was 22.  Menopause in 2007.  Family history: No breast cancer.  Maternal aunt may have had metastatic uterine cancer.     June 27, 2025  The patient follows up after having left breast stereotactic biopsy.  The patient had bleeding after the biopsy which stopped.  She has a hematoma and bruising.  No other complaints.    Past medical history:  Prediabetes  Broken nose at age 15    Past Medical History  She has no past medical history on file.    Surgical History  She has a past surgical history that includes Breast biopsy (Left, 6/5/2025).     Allergies  Sulfamethoxazole-trimethoprim    Social History  She reports that she has never smoked. She has never used smokeless tobacco. She reports current alcohol use of about 1.0 standard drink of alcohol per week. She reports that she does not use drugs.    Family History  Family History[1]    Last Recorded Vitals  Blood pressure 141/81, pulse 81, temperature 36.4 °C (97.6 °F), resp. rate 18, height 1.6 m (5' 3\"), weight 64.4 kg (142 lb), SpO2 97%.    Physical Exam  General: Well-developed, well-nourished, no acute distress, alert and oriented  Left breast wound 0.4 mm diameter eschar with 2 cm diameter hematoma.  Resolving ecchymosis of breast.  No erythema or signs of infection.  Examination chaperoned by Hyacinth    Relevant Results  Surgical Pathology Exam: [id number]  Order: [id number]   Collected 6/5/2025 14:07       Status: Final result       Dx: Abnormal finding on breast imaging    Test Result Released: Yes (seen)    0 Result Notes       Component  Resulting Agency   FINAL DIAGNOSIS   A. LEFT BREAST CALCIFICATIONS, STEREOTACTIC-GUIDED CORE BIOPSY:     -- Apocrine " metaplasia and benign lobules with associated microcalcifications.  -- Focal fat necrosis.  -- Benign skin.           Assessment/Plan   Diagnoses and all orders for this visit:  Abnormal mammogram of left breast    Recovering well from left breast core biopsy.  Radiologist felt that the pathology is benign-concordant recommends routine mammographic screening.  The patient will be due for repeat bilateral mammogram in October.  I offered to order the mammogram and examine her after the mammogram is completed.  She prefers to have future mammograms and exams with her family doctor.  Breast self-examination monthly.  Follow-up as needed.    Shiraz Espinosa MD         [1]   Family History  Problem Relation Name Age of Onset    Alcohol abuse Father Alejandro     Diabetes Maternal Grandmother Cira     Alcohol abuse Father Alejandro     Diabetes Maternal Grandmother Cira

## 2025-07-01 ENCOUNTER — TELEPHONE (OUTPATIENT)
Dept: CARDIAC SURGERY | Facility: HOSPITAL | Age: 65
End: 2025-07-01
Payer: MEDICARE

## 2025-07-01 NOTE — TELEPHONE ENCOUNTER
Left vmail about upcoming appointment that was scheduled with the wrong provider,  can be scheduled with a NP.

## 2025-07-25 ENCOUNTER — APPOINTMENT (OUTPATIENT)
Dept: SURGICAL ONCOLOGY | Facility: CLINIC | Age: 65
End: 2025-07-25
Payer: MEDICARE

## 2025-11-03 ENCOUNTER — APPOINTMENT (OUTPATIENT)
Dept: PRIMARY CARE | Facility: CLINIC | Age: 65
End: 2025-11-03
Payer: MEDICARE